# Patient Record
(demographics unavailable — no encounter records)

---

## 2025-03-29 NOTE — HISTORY OF PRESENT ILLNESS
[FreeTextEntry1] : Follow-up [de-identified] : Patient presents for follow-up anxiety hyperlipidemia vertigo hypercalcemia Endocrinology consult discussed with the patient calcium improved slightly elevated PTH. Anxiety patient is doing better currently has a job with guest relations doing well Vertigo well-controlled with as needed meclizine

## 2025-03-29 NOTE — PHYSICAL EXAM
[No Acute Distress] : no acute distress [No Respiratory Distress] : no respiratory distress  [No Edema] : there was no peripheral edema [Coordination Grossly Intact] : coordination grossly intact [Normal] : affect was normal and insight and judgment were intact

## 2025-03-29 NOTE — ASSESSMENT
[FreeTextEntry1] : Hypercalcemia secondary to hyperparathyroidism improved clinically stable will monitor labs follow-up endocrinology.  Normal renal function however has osteoporosis.  Advised patient to follow-up with endocrinology to discuss treatment options Anxiety well-controlled we will continue paroxetine Hyperlipidemia will return for fasting labs next visit Vitamin D deficiency supplement Vertigo symptoms well-controlled continue meclizine as needed Follow-up 3 months/pending results